# Patient Record
Sex: FEMALE | Race: WHITE | NOT HISPANIC OR LATINO | URBAN - METROPOLITAN AREA
[De-identification: names, ages, dates, MRNs, and addresses within clinical notes are randomized per-mention and may not be internally consistent; named-entity substitution may affect disease eponyms.]

---

## 2018-06-13 ENCOUNTER — EMERGENCY (EMERGENCY)
Facility: HOSPITAL | Age: 43
LOS: 1 days | Discharge: HOME | End: 2018-06-13
Attending: EMERGENCY MEDICINE | Admitting: EMERGENCY MEDICINE
Payer: COMMERCIAL

## 2018-06-13 VITALS
SYSTOLIC BLOOD PRESSURE: 152 MMHG | OXYGEN SATURATION: 100 % | HEART RATE: 64 BPM | TEMPERATURE: 99 F | DIASTOLIC BLOOD PRESSURE: 89 MMHG | RESPIRATION RATE: 16 BRPM

## 2018-06-13 VITALS
SYSTOLIC BLOOD PRESSURE: 156 MMHG | DIASTOLIC BLOOD PRESSURE: 95 MMHG | TEMPERATURE: 99 F | HEART RATE: 66 BPM | OXYGEN SATURATION: 100 % | RESPIRATION RATE: 16 BRPM

## 2018-06-13 LAB
ALBUMIN SERPL ELPH-MCNC: 4.8 G/DL — SIGNIFICANT CHANGE UP (ref 3.3–5)
ALP SERPL-CCNC: 66 U/L — SIGNIFICANT CHANGE UP (ref 40–120)
ALT FLD-CCNC: 19 U/L — SIGNIFICANT CHANGE UP (ref 4–33)
APPEARANCE UR: SIGNIFICANT CHANGE UP
AST SERPL-CCNC: 22 U/L — SIGNIFICANT CHANGE UP (ref 4–32)
BACTERIA # UR AUTO: SIGNIFICANT CHANGE UP
BASOPHILS # BLD AUTO: 0.12 K/UL — SIGNIFICANT CHANGE UP (ref 0–0.2)
BASOPHILS NFR BLD AUTO: 1.5 % — SIGNIFICANT CHANGE UP (ref 0–2)
BILIRUB SERPL-MCNC: 0.3 MG/DL — SIGNIFICANT CHANGE UP (ref 0.2–1.2)
BILIRUB UR-MCNC: NEGATIVE — SIGNIFICANT CHANGE UP
BLOOD UR QL VISUAL: HIGH
BUN SERPL-MCNC: 11 MG/DL — SIGNIFICANT CHANGE UP (ref 7–23)
CALCIUM SERPL-MCNC: 9.6 MG/DL — SIGNIFICANT CHANGE UP (ref 8.4–10.5)
CHLORIDE SERPL-SCNC: 99 MMOL/L — SIGNIFICANT CHANGE UP (ref 98–107)
CO2 SERPL-SCNC: 29 MMOL/L — SIGNIFICANT CHANGE UP (ref 22–31)
COLOR SPEC: SIGNIFICANT CHANGE UP
CREAT SERPL-MCNC: 0.8 MG/DL — SIGNIFICANT CHANGE UP (ref 0.5–1.3)
EOSINOPHIL # BLD AUTO: 0.31 K/UL — SIGNIFICANT CHANGE UP (ref 0–0.5)
EOSINOPHIL NFR BLD AUTO: 3.8 % — SIGNIFICANT CHANGE UP (ref 0–6)
GLUCOSE SERPL-MCNC: 86 MG/DL — SIGNIFICANT CHANGE UP (ref 70–99)
GLUCOSE UR-MCNC: NEGATIVE — SIGNIFICANT CHANGE UP
HCG SERPL-ACNC: < 5 MIU/ML — SIGNIFICANT CHANGE UP
HCT VFR BLD CALC: 38.8 % — SIGNIFICANT CHANGE UP (ref 34.5–45)
HGB BLD-MCNC: 13.5 G/DL — SIGNIFICANT CHANGE UP (ref 11.5–15.5)
IMM GRANULOCYTES # BLD AUTO: 0.02 # — SIGNIFICANT CHANGE UP
IMM GRANULOCYTES NFR BLD AUTO: 0.2 % — SIGNIFICANT CHANGE UP (ref 0–1.5)
KETONES UR-MCNC: NEGATIVE — SIGNIFICANT CHANGE UP
LEUKOCYTE ESTERASE UR-ACNC: SIGNIFICANT CHANGE UP
LIDOCAIN IGE QN: 57.4 U/L — SIGNIFICANT CHANGE UP (ref 7–60)
LYMPHOCYTES # BLD AUTO: 3.83 K/UL — HIGH (ref 1–3.3)
LYMPHOCYTES # BLD AUTO: 47.1 % — HIGH (ref 13–44)
MCHC RBC-ENTMCNC: 31.9 PG — SIGNIFICANT CHANGE UP (ref 27–34)
MCHC RBC-ENTMCNC: 34.8 % — SIGNIFICANT CHANGE UP (ref 32–36)
MCV RBC AUTO: 91.7 FL — SIGNIFICANT CHANGE UP (ref 80–100)
MONOCYTES # BLD AUTO: 0.73 K/UL — SIGNIFICANT CHANGE UP (ref 0–0.9)
MONOCYTES NFR BLD AUTO: 9 % — SIGNIFICANT CHANGE UP (ref 2–14)
NEUTROPHILS # BLD AUTO: 3.13 K/UL — SIGNIFICANT CHANGE UP (ref 1.8–7.4)
NEUTROPHILS NFR BLD AUTO: 38.4 % — LOW (ref 43–77)
NITRITE UR-MCNC: NEGATIVE — SIGNIFICANT CHANGE UP
NRBC # FLD: 0 — SIGNIFICANT CHANGE UP
PH UR: 7 — SIGNIFICANT CHANGE UP (ref 4.6–8)
PLATELET # BLD AUTO: 282 K/UL — SIGNIFICANT CHANGE UP (ref 150–400)
PMV BLD: 11.2 FL — SIGNIFICANT CHANGE UP (ref 7–13)
POTASSIUM SERPL-MCNC: 3.9 MMOL/L — SIGNIFICANT CHANGE UP (ref 3.5–5.3)
POTASSIUM SERPL-SCNC: 3.9 MMOL/L — SIGNIFICANT CHANGE UP (ref 3.5–5.3)
PROT SERPL-MCNC: 7.1 G/DL — SIGNIFICANT CHANGE UP (ref 6–8.3)
PROT UR-MCNC: NEGATIVE MG/DL — SIGNIFICANT CHANGE UP
RBC # BLD: 4.23 M/UL — SIGNIFICANT CHANGE UP (ref 3.8–5.2)
RBC # FLD: 13.4 % — SIGNIFICANT CHANGE UP (ref 10.3–14.5)
RBC CASTS # UR COMP ASSIST: SIGNIFICANT CHANGE UP (ref 0–?)
SODIUM SERPL-SCNC: 142 MMOL/L — SIGNIFICANT CHANGE UP (ref 135–145)
SP GR SPEC: 1.01 — SIGNIFICANT CHANGE UP (ref 1–1.04)
SQUAMOUS # UR AUTO: SIGNIFICANT CHANGE UP
UROBILINOGEN FLD QL: NORMAL MG/DL — SIGNIFICANT CHANGE UP
WBC # BLD: 8.14 K/UL — SIGNIFICANT CHANGE UP (ref 3.8–10.5)
WBC # FLD AUTO: 8.14 K/UL — SIGNIFICANT CHANGE UP (ref 3.8–10.5)
WBC UR QL: HIGH (ref 0–?)

## 2018-06-13 PROCEDURE — 76830 TRANSVAGINAL US NON-OB: CPT | Mod: 26

## 2018-06-13 PROCEDURE — 99284 EMERGENCY DEPT VISIT MOD MDM: CPT

## 2018-06-13 RX ORDER — KETOROLAC TROMETHAMINE 30 MG/ML
15 SYRINGE (ML) INJECTION ONCE
Qty: 0 | Refills: 0 | Status: DISCONTINUED | OUTPATIENT
Start: 2018-06-13 | End: 2018-06-13

## 2018-06-13 RX ADMIN — Medication 15 MILLIGRAM(S): at 19:07

## 2018-06-13 NOTE — ED PROVIDER NOTE - ATTENDING CONTRIBUTION TO CARE
Dr. Rendon:  I have personally performed a face to face bedside history and physical examination of this patient. I have discussed the history, examination, review of systems, assessment and plan of management with the resident. I have reviewed the electronic medical record and amended it to reflect my history, review of systems, physical exam, assessment and plan.    42F h/o R ovarian cyst and menorrhagia presents with vaginal bleeding and R pelvic pain that seems worse than usual.    Exam:  - nad  - rrr  - ctab   -abd soft ntnd  - pelvic as per resident    A/P  - likely exacerbation of symptoms, r/o ovarian torsion  - cbc, cmp, ua  - transvaginal US

## 2018-06-13 NOTE — ED PROVIDER NOTE - OBJECTIVE STATEMENT
Kelly Thakkar, DO: 42F R ovarian cysts with hx of metromenorrhagia here for vaginal bleeding associated with R sided abdominal pain described as pulling, intermittent without exacerbating or allevating factors. Took 400 mg Motrin 7AM. +malodorous urine. Missed appointment on [facility] 3 for endometrial ablation. Started on BC ~4 month ago. Sexual preference for men, last sexually active 3 weeks ago.     PMHx: anxiety on Citalopram.  PSHx: appendectomy, cholecystectomy  Allergies: Sulfa Kelly Ariane, DO: 42F R ovarian cysts with hx of menorrhagia here for vaginal bleeding associated with R sided abdominal pain described as pulling, intermittent without exacerbating or allevating factors. Took 400 mg Motrin 7AM. +malodorous urine. Missed appointment on [facility] 3 for endometrial ablation. Started on BC ~4 month ago. Sexual preference for men, last sexually active 3 weeks ago. 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 Denies vaginal discharge, fevers, nausea/vomiting, CP/SOB, diarrhea.     PMHx: anxiety on Citalopram.  PSHx: appendectomy, cholecystectomy 13 years ago  Allergies: Sulfa

## 2018-06-13 NOTE — ED ADULT NURSE NOTE - OBJECTIVE STATEMENT
pt in intake complaining of rlq pain  with vag discharge  pt has had appendectomy and cholecystectomy  vag exm by ed md done saline lock 20 g left ac placed labs sent urine and u culture sent will continue to monitor

## 2018-06-13 NOTE — ED PROVIDER NOTE - MEDICAL DECISION MAKING DETAILS
Kelly Thakkar, DO: 42F with hx of metrorrhagia here for RLQ pain with RLQ TTP & RUQ TTP. Hx of cholecystectomy 13 years ago. No pain associated with eating. Upreg, pain control & reassessment with referral to OBGYN for scheduled endometrial biopsy.

## 2018-06-13 NOTE — ED PROVIDER NOTE - PHYSICAL EXAMINATION
Kelly Thakkar, DO:   Gen: Well appearing, NAD  Head: NCAT  HEENT: PERRL, MMM, normal conjunctiva, anicteric, neck supple  Lung: CTAB, no adventitious sounds  CV: RRR, no murmurs, rubs or gallops  Abd: soft, TTP on right side & epigastrum UQ>LQ without rebound or guarding, no CVAT  : external genitalia normal, brown/bloody discharge from cervical os, no adnexal TTP or CMT  MSK: No edema, no visible deformities  Neuro: Ambulatory with steady gait  Skin: Warm and dry, no evidence of rash  Psych: normal mood and affect

## 2018-06-15 LAB
BACTERIA UR CULT: SIGNIFICANT CHANGE UP
SPECIMEN SOURCE: SIGNIFICANT CHANGE UP

## 2021-09-01 ENCOUNTER — OUTPATIENT (OUTPATIENT)
Dept: OUTPATIENT SERVICES | Facility: HOSPITAL | Age: 46
LOS: 1 days | End: 2021-09-01
Payer: COMMERCIAL

## 2021-09-01 VITALS
HEIGHT: 67 IN | OXYGEN SATURATION: 97 % | SYSTOLIC BLOOD PRESSURE: 118 MMHG | HEART RATE: 70 BPM | DIASTOLIC BLOOD PRESSURE: 86 MMHG | WEIGHT: 220.02 LBS | RESPIRATION RATE: 16 BRPM | TEMPERATURE: 98 F

## 2021-09-01 DIAGNOSIS — Z85.828 PERSONAL HISTORY OF OTHER MALIGNANT NEOPLASM OF SKIN: Chronic | ICD-10-CM

## 2021-09-01 DIAGNOSIS — N84.0 POLYP OF CORPUS UTERI: ICD-10-CM

## 2021-09-01 LAB
ANION GAP SERPL CALC-SCNC: 15 MMOL/L — HIGH (ref 7–14)
BUN SERPL-MCNC: 21 MG/DL — SIGNIFICANT CHANGE UP (ref 7–23)
CALCIUM SERPL-MCNC: 9.6 MG/DL — SIGNIFICANT CHANGE UP (ref 8.4–10.5)
CHLORIDE SERPL-SCNC: 95 MMOL/L — LOW (ref 98–107)
CO2 SERPL-SCNC: 27 MMOL/L — SIGNIFICANT CHANGE UP (ref 22–31)
CREAT SERPL-MCNC: 0.88 MG/DL — SIGNIFICANT CHANGE UP (ref 0.5–1.3)
GLUCOSE SERPL-MCNC: 83 MG/DL — SIGNIFICANT CHANGE UP (ref 70–99)
HCG UR QL: NEGATIVE — SIGNIFICANT CHANGE UP
HCT VFR BLD CALC: 36.6 % — SIGNIFICANT CHANGE UP (ref 34.5–45)
HGB BLD-MCNC: 12.6 G/DL — SIGNIFICANT CHANGE UP (ref 11.5–15.5)
MCHC RBC-ENTMCNC: 32.1 PG — SIGNIFICANT CHANGE UP (ref 27–34)
MCHC RBC-ENTMCNC: 34.4 GM/DL — SIGNIFICANT CHANGE UP (ref 32–36)
MCV RBC AUTO: 93.4 FL — SIGNIFICANT CHANGE UP (ref 80–100)
NRBC # BLD: 0 /100 WBCS — SIGNIFICANT CHANGE UP
NRBC # FLD: 0 K/UL — SIGNIFICANT CHANGE UP
PLATELET # BLD AUTO: 422 K/UL — HIGH (ref 150–400)
POTASSIUM SERPL-MCNC: 4.2 MMOL/L — SIGNIFICANT CHANGE UP (ref 3.5–5.3)
POTASSIUM SERPL-SCNC: 4.2 MMOL/L — SIGNIFICANT CHANGE UP (ref 3.5–5.3)
RBC # BLD: 3.92 M/UL — SIGNIFICANT CHANGE UP (ref 3.8–5.2)
RBC # FLD: 13.2 % — SIGNIFICANT CHANGE UP (ref 10.3–14.5)
SODIUM SERPL-SCNC: 137 MMOL/L — SIGNIFICANT CHANGE UP (ref 135–145)
WBC # BLD: 10.61 K/UL — HIGH (ref 3.8–10.5)
WBC # FLD AUTO: 10.61 K/UL — HIGH (ref 3.8–10.5)

## 2021-09-01 PROCEDURE — 93010 ELECTROCARDIOGRAM REPORT: CPT

## 2021-09-01 NOTE — H&P PST ADULT - PROBLEM SELECTOR PLAN 1
This is a 45 y/o female who is scheduled for D&C, hysteroscopy with symphion on 9-21-21  * Instructed to speak with surgeon regarding covid testing  * Given preop instructions  * Instructed to take normal am dose of losartan, citalopram, and albuterol (if needed) the am of surgery

## 2021-09-01 NOTE — H&P PST ADULT - NSICDXPASTMEDICALHX_GEN_ALL_CORE_FT
PAST MEDICAL HISTORY:  Anxiety     Asthma     Bacterial UTI on antibiotic to be completed on 9-2-21    Depression     Fibroids     Irregular bleeding

## 2021-09-01 NOTE — H&P PST ADULT - NSICDXPASTSURGICALHX_GEN_ALL_CORE_FT
PAST SURGICAL HISTORY:  History of skin cancer Moh's surgery for squamous cell CA on the nose    Knee injury left knee orthoscopic surgery 1989    S/P appendectomy 2000    S/P cholecystectomy 2004

## 2021-09-01 NOTE — H&P PST ADULT - NSANTHOSAYNRD_GEN_A_CORE
No. LANDON screening performed.  STOP BANG Legend: 0-2 = LOW Risk; 3-4 = INTERMEDIATE Risk; 5-8 = HIGH Risk

## 2021-09-17 DIAGNOSIS — Z01.818 ENCOUNTER FOR OTHER PREPROCEDURAL EXAMINATION: ICD-10-CM

## 2021-09-18 ENCOUNTER — APPOINTMENT (OUTPATIENT)
Dept: DISASTER EMERGENCY | Facility: CLINIC | Age: 46
End: 2021-09-18

## 2021-09-19 LAB — SARS-COV-2 N GENE NPH QL NAA+PROBE: NOT DETECTED

## 2021-09-20 ENCOUNTER — TRANSCRIPTION ENCOUNTER (OUTPATIENT)
Age: 46
End: 2021-09-20

## 2021-09-20 VITALS
HEART RATE: 52 BPM | TEMPERATURE: 97 F | RESPIRATION RATE: 18 BRPM | HEIGHT: 67 IN | SYSTOLIC BLOOD PRESSURE: 120 MMHG | OXYGEN SATURATION: 100 % | WEIGHT: 220.02 LBS | DIASTOLIC BLOOD PRESSURE: 89 MMHG

## 2021-09-21 ENCOUNTER — RESULT REVIEW (OUTPATIENT)
Age: 46
End: 2021-09-21

## 2021-09-21 ENCOUNTER — OUTPATIENT (OUTPATIENT)
Dept: OUTPATIENT SERVICES | Facility: HOSPITAL | Age: 46
LOS: 1 days | Discharge: HOME | End: 2021-09-21
Payer: COMMERCIAL

## 2021-09-21 VITALS
OXYGEN SATURATION: 100 % | TEMPERATURE: 97 F | RESPIRATION RATE: 15 BRPM | DIASTOLIC BLOOD PRESSURE: 97 MMHG | SYSTOLIC BLOOD PRESSURE: 148 MMHG | HEART RATE: 53 BPM

## 2021-09-21 DIAGNOSIS — Z85.828 PERSONAL HISTORY OF OTHER MALIGNANT NEOPLASM OF SKIN: Chronic | ICD-10-CM

## 2021-09-21 DIAGNOSIS — N84.0 POLYP OF CORPUS UTERI: ICD-10-CM

## 2021-09-21 PROCEDURE — 88305 TISSUE EXAM BY PATHOLOGIST: CPT | Mod: 26

## 2021-09-21 RX ORDER — NITROFURANTOIN MACROCRYSTAL 50 MG
100 CAPSULE ORAL
Qty: 0 | Refills: 0 | DISCHARGE

## 2021-09-21 RX ORDER — SODIUM CHLORIDE 9 MG/ML
1000 INJECTION, SOLUTION INTRAVENOUS
Refills: 0 | Status: DISCONTINUED | OUTPATIENT
Start: 2021-09-21 | End: 2021-10-05

## 2021-09-21 NOTE — ASU PREOP CHECKLIST - PATIENT SENT TO
Left patient a VM asking to please call back to set up an apt  If patient calls back please refer to message below  Thank you!     ----- Message from Ava Jimenez MD sent at 4/30/2021  3:45 PM EDT -----    Please have this patient schedule an appointment to discuss audiology results  operating room

## 2021-09-21 NOTE — ASU DISCHARGE PLAN (ADULT/PEDIATRIC) - CARE PROVIDER_API CALL
Connie Bah  OBSTETRICS AND GYNECOLOGY  [address], [address]  [place] [address]  Phone: [phone]  Fax: [phone]  Follow Up Time: 2 weeks

## 2021-09-28 LAB — SURGICAL PATHOLOGY STUDY: SIGNIFICANT CHANGE UP

## 2021-11-18 ENCOUNTER — EMERGENCY (EMERGENCY)
Facility: HOSPITAL | Age: 46
LOS: 1 days | Discharge: HOME | End: 2021-11-18
Attending: EMERGENCY MEDICINE
Payer: COMMERCIAL

## 2021-11-18 VITALS
OXYGEN SATURATION: 99 % | WEIGHT: 225.97 LBS | HEIGHT: 67 IN | DIASTOLIC BLOOD PRESSURE: 89 MMHG | RESPIRATION RATE: 19 BRPM | SYSTOLIC BLOOD PRESSURE: 145 MMHG | HEART RATE: 107 BPM | TEMPERATURE: 98 F

## 2021-11-18 DIAGNOSIS — Z85.828 PERSONAL HISTORY OF OTHER MALIGNANT NEOPLASM OF SKIN: Chronic | ICD-10-CM

## 2021-11-18 LAB
ALBUMIN SERPL ELPH-MCNC: 4.6 G/DL — SIGNIFICANT CHANGE UP (ref 3.3–5)
ALP SERPL-CCNC: 71 U/L — SIGNIFICANT CHANGE UP (ref 40–120)
ALT FLD-CCNC: 15 U/L — SIGNIFICANT CHANGE UP (ref 10–45)
ANION GAP SERPL CALC-SCNC: 14 MMOL/L — SIGNIFICANT CHANGE UP (ref 5–17)
AST SERPL-CCNC: 15 U/L — SIGNIFICANT CHANGE UP (ref 10–40)
BASE EXCESS BLDV CALC-SCNC: 3.2 MMOL/L — HIGH (ref -2–2)
BILIRUB SERPL-MCNC: 0.3 MG/DL — SIGNIFICANT CHANGE UP (ref 0.2–1.2)
BUN SERPL-MCNC: 14 MG/DL — SIGNIFICANT CHANGE UP (ref 7–23)
CA-I SERPL-SCNC: 1.18 MMOL/L — SIGNIFICANT CHANGE UP (ref 1.15–1.33)
CALCIUM SERPL-MCNC: 9.3 MG/DL — SIGNIFICANT CHANGE UP (ref 8.4–10.5)
CHLORIDE BLDV-SCNC: 100 MMOL/L — SIGNIFICANT CHANGE UP (ref 96–108)
CHLORIDE SERPL-SCNC: 98 MMOL/L — SIGNIFICANT CHANGE UP (ref 96–108)
CO2 BLDV-SCNC: 30 MMOL/L — HIGH (ref 22–26)
CO2 SERPL-SCNC: 24 MMOL/L — SIGNIFICANT CHANGE UP (ref 22–31)
CREAT SERPL-MCNC: 0.76 MG/DL — SIGNIFICANT CHANGE UP (ref 0.5–1.3)
GAS PNL BLDV: 135 MMOL/L — LOW (ref 136–145)
GAS PNL BLDV: SIGNIFICANT CHANGE UP
GLUCOSE BLDV-MCNC: 83 MG/DL — SIGNIFICANT CHANGE UP (ref 70–99)
GLUCOSE SERPL-MCNC: 87 MG/DL — SIGNIFICANT CHANGE UP (ref 70–99)
HCO3 BLDV-SCNC: 28 MMOL/L — SIGNIFICANT CHANGE UP (ref 22–29)
HCT VFR BLDA CALC: 41 % — SIGNIFICANT CHANGE UP (ref 34.5–46.5)
HGB BLD CALC-MCNC: 13.5 G/DL — SIGNIFICANT CHANGE UP (ref 11.7–16.1)
LACTATE BLDV-MCNC: 1 MMOL/L — SIGNIFICANT CHANGE UP (ref 0.7–2)
LIDOCAIN IGE QN: 38 U/L — SIGNIFICANT CHANGE UP (ref 7–60)
PCO2 BLDV: 45 MMHG — HIGH (ref 39–42)
PH BLDV: 7.41 — SIGNIFICANT CHANGE UP (ref 7.32–7.43)
PO2 BLDV: 23 MMHG — LOW (ref 25–45)
POTASSIUM BLDV-SCNC: 3.6 MMOL/L — SIGNIFICANT CHANGE UP (ref 3.5–5.1)
POTASSIUM SERPL-MCNC: 3.5 MMOL/L — SIGNIFICANT CHANGE UP (ref 3.5–5.3)
POTASSIUM SERPL-SCNC: 3.5 MMOL/L — SIGNIFICANT CHANGE UP (ref 3.5–5.3)
PROT SERPL-MCNC: 7 G/DL — SIGNIFICANT CHANGE UP (ref 6–8.3)
SAO2 % BLDV: 40.1 % — LOW (ref 67–88)
SODIUM SERPL-SCNC: 136 MMOL/L — SIGNIFICANT CHANGE UP (ref 135–145)

## 2021-11-18 PROCEDURE — 99285 EMERGENCY DEPT VISIT HI MDM: CPT

## 2021-11-18 RX ORDER — ACETAMINOPHEN 500 MG
975 TABLET ORAL ONCE
Refills: 0 | Status: COMPLETED | OUTPATIENT
Start: 2021-11-18 | End: 2021-11-18

## 2021-11-18 RX ORDER — IBUPROFEN 200 MG
600 TABLET ORAL ONCE
Refills: 0 | Status: COMPLETED | OUTPATIENT
Start: 2021-11-18 | End: 2021-11-18

## 2021-11-18 RX ORDER — ONDANSETRON 8 MG/1
4 TABLET, FILM COATED ORAL ONCE
Refills: 0 | Status: COMPLETED | OUTPATIENT
Start: 2021-11-18 | End: 2021-11-18

## 2021-11-18 RX ADMIN — ONDANSETRON 4 MILLIGRAM(S): 8 TABLET, FILM COATED ORAL at 22:39

## 2021-11-18 RX ADMIN — Medication 975 MILLIGRAM(S): at 22:39

## 2021-11-18 RX ADMIN — Medication 600 MILLIGRAM(S): at 22:39

## 2021-11-18 NOTE — ED PROVIDER NOTE - RAPID ASSESSMENT
46y F p/w intermittent R sided abd pain x2 weeks, worsening today. Describes pain as spasm like in nature. Endorses associated nausea. Notes that she had 3 BMs yesterday, normal.  No pain meds for symptoms PTA. Denies vomiting, diarrhea, fever, chills, urinary symptoms.     Patient was seen as a tele QDOC patient. The patient will be seen and further worked up in the main emergency department and their care will be completed by the main emergency department team along with a thorough physical exam. Receiving team will follow up on labs, analgesia, any clinical imaging, reassess and disposition as clinically indicated, all decisions regarding the progression of care will be made at their discretion.    Scribe Statement: TAYLOR, Avel Fritz, attest that this documentation has been prepared under the direction and in the presence of Archie Cedillo) 46y F p/w intermittent R sided abd pain x2 weeks, worsening today. Describes pain as spasm like in nature. Endorses associated nausea. Notes that she had 3 BMs yesterday, normal.  No pain meds for symptoms PTA. Denies vomiting, diarrhea, fever, chills, urinary symptoms.     Patient was seen as a tele QDOC patient. The patient will be seen and further worked up in the main emergency department and their care will be completed by the main emergency department team along with a thorough physical exam. Receiving team will follow up on labs, analgesia, any clinical imaging, reassess and disposition as clinically indicated, all decisions regarding the progression of care will be made at their discretion.    Scribe Statement: Avel VAZQUEZ, attest that this documentation has been prepared under the direction and in the presence of Archie Cedillo)    Patient was seen as a QPA patient. The patient will be seen and further worked up in the main emergency department and their care will be completed by the main emergency department team along with a thorough physical exam. Receiving team will follow up on labs, analgesia, any clinical imaging, reassess and disposition as clinically indicated, all decisions regarding the progression of care will be made at their discretion. 46y F p/w intermittent R sided abd pain x2 weeks, worsening today. Describes pain as spasm like in nature. Endorses associated nausea. Notes that she had 3 BMs yesterday, normal.  No pain meds for symptoms PTA. Denies vomiting, diarrhea, fever, chills, urinary symptoms.     Patient was seen as a tele QDOC patient. The patient will be seen and further worked up in the main emergency department and their care will be completed by the main emergency department team along with a thorough physical exam. Receiving team will follow up on labs, analgesia, any clinical imaging, reassess and disposition as clinically indicated, all decisions regarding the progression of care will be made at their discretion.    Scribe Statement: I, Avel Fritz, attest that this documentation has been prepared under the direction and in the presence of Archie Cedillo (PA)    Patient was seen as a QPA patient. The patient will be seen and further worked up in the main emergency department and their care will be completed by the main emergency department team along with a thorough physical exam. Receiving team will follow up on labs, analgesia, any clinical imaging, reassess and disposition as clinically indicated, all decisions regarding the progression of care will be made at their discretion.    Attending MD Arroyo: This patient was seen and orders were placed by the PA as per our department's QPA model.  I was not consulted in regards to this patient although I was present and available in the Emergency Department to the PA.  Patient was to be sent to main ED for full medical evaluation and receiving team was to follow up on any labs, analgesia, clinical imaging ordered by the PA.  Any reassessment and disposition decisions were to be made by receiving team as clinically indicated, all decisions regarding the progression of care to be made at their discretion.  I did not perform a comprehensive history and physical on this patient.

## 2021-11-18 NOTE — ED PROVIDER NOTE - NSFOLLOWUPINSTRUCTIONS_ED_ALL_ED_FT
You were seen in the ED for abdominal pain.  Your blood work and CT abdomen were reassuring and did not show a reason for your pain.  It's unclear what caused your pain.  You need to follow up with a gastroenterologist for further management in 2-3 days.      For pain, please take 650mg Tylenol every 6 hours as needed.  Take Maalox and pepcid as per the over the counter bottles.    Please follow up with your primary care doctor in 2-3 days. Return to the ED if you experience any worsening or new symptoms or any symptoms that concern you, including fevers, chills, shortness of breath, chest pain, abdominal pain, vomiting.

## 2021-11-18 NOTE — ED PROVIDER NOTE - ATTENDING CONTRIBUTION TO CARE
I saw and evaluated patient with resident. I discussed H+P and MDM with resident. I agree with the statements made by the resident and have made any relevant edits on the note above.

## 2021-11-18 NOTE — ED PROVIDER NOTE - PHYSICAL EXAMINATION
Ayleen Cardoza MD  GENERAL: Patient awake alert NAD.  HEENT: NC/AT, Moist mucous membranes, EOMI.  LUNGS: CTAB, no wheezes or crackles.   CARDIAC: RRR, no m/r/g.    ABDOMEN: Soft, +tender RUQ and epigastric, ND, No rebound, guarding. No CVA tenderness.   EXT: No edema.   MSK: no pain with movement, no deformities.  NEURO: A&Ox3. Moving all extremities.  SKIN: Warm and dry. No rash.  PSYCH: Normal affect.

## 2021-11-18 NOTE — ED PROVIDER NOTE - OBJECTIVE STATEMENT
47 yo F with h/o cholecystectomy and appendectomy presenting with abd pain. Pain has been going on for 2 weeks, worsening today. Episodes last 2-3 hrs, with 2-3 hrs of relief in between. Feels like a spasm and a cramp. Does not radiate. Endorses nausea, denies vomiting. Denies diarrhea, constipation, fevers, chills, SOB, CP. Last bowel movement Thurs am, no melena. No hematuria, dysuria, vaginal discharge. Does not drink alcohol. Pain worse with eating sometimes

## 2021-11-18 NOTE — ED PROVIDER NOTE - NSICDXPASTMEDICALHX_GEN_ALL_CORE_FT
PAST MEDICAL HISTORY:  Anxiety     Asthma     Bacterial UTI on antibiotic to be completed on 9-2-21    Depression     Fibroids     Hypothyroidism     Irregular bleeding

## 2021-11-18 NOTE — ED PROVIDER NOTE - PROGRESS NOTE DETAILS
Dr. Lobo's Note: Pt's workup neg for acute intra-abd pathology. labs unremarkable. on re-eval, pt is well appearing, mild tenderness but improved pain. pt tolerating po. Multiple diagnoses were considered during patient's evaluation today. While exact etiology of symptoms is unclear, there appears to be no emergent process today that would require further emergent or inpatient management. Pt is safe for dc with outpt f/u and return instructions if symptoms worsen.

## 2021-11-18 NOTE — ED PROVIDER NOTE - CLINICAL SUMMARY MEDICAL DECISION MAKING FREE TEXT BOX
pt with h/o cholecystectomy and appendectomy presenting with abd pain. concern for sbo low, likely GERD or PUD, however given tenderness will check labs and CT abd 45 yo F with h/o cholecystectomy and appendectomy presenting with abd pain. concern for pancreatitis vs sbo low, likely GERD or PUD, however given tenderness will check labs and CT abd

## 2021-11-18 NOTE — ED PROVIDER NOTE - PATIENT PORTAL LINK FT
You can access the FollowMyHealth Patient Portal offered by [facility] by registering at the following website: [email or web address]. By joining [employer]’s FollowMyHealth portal, you will also be able to view your health information using other applications (apps) compatible with our system.

## 2021-11-18 NOTE — ED ADULT TRIAGE NOTE - CCCP TRG CHIEF CMPLNT
No interval changes since initial CDU provider note. Pt feels well without complaint. NAD VSS. no events on tele. Plan for tele and stress test today. - EMA Raya
abdominal pain

## 2021-11-19 VITALS
DIASTOLIC BLOOD PRESSURE: 68 MMHG | OXYGEN SATURATION: 100 % | RESPIRATION RATE: 17 BRPM | TEMPERATURE: 98 F | HEART RATE: 78 BPM | SYSTOLIC BLOOD PRESSURE: 123 MMHG

## 2021-11-19 PROBLEM — E03.9 HYPOTHYROIDISM, UNSPECIFIED: Chronic | Status: ACTIVE | Noted: 2021-09-21

## 2021-11-19 LAB
APPEARANCE UR: CLEAR — SIGNIFICANT CHANGE UP
BACTERIA # UR AUTO: ABNORMAL
BILIRUB UR-MCNC: NEGATIVE — SIGNIFICANT CHANGE UP
COLOR SPEC: SIGNIFICANT CHANGE UP
DIFF PNL FLD: NEGATIVE — SIGNIFICANT CHANGE UP
EPI CELLS # UR: 4 /HPF — SIGNIFICANT CHANGE UP
GLUCOSE UR QL: NEGATIVE — SIGNIFICANT CHANGE UP
HYALINE CASTS # UR AUTO: 2 /LPF — SIGNIFICANT CHANGE UP (ref 0–2)
KETONES UR-MCNC: ABNORMAL
LEUKOCYTE ESTERASE UR-ACNC: NEGATIVE — SIGNIFICANT CHANGE UP
NITRITE UR-MCNC: NEGATIVE — SIGNIFICANT CHANGE UP
PH UR: 6 — SIGNIFICANT CHANGE UP (ref 5–8)
PROT UR-MCNC: NEGATIVE — SIGNIFICANT CHANGE UP
RBC CASTS # UR COMP ASSIST: 11 /HPF — HIGH (ref 0–4)
SARS-COV-2 RNA SPEC QL NAA+PROBE: SIGNIFICANT CHANGE UP
SP GR SPEC: 1.02 — SIGNIFICANT CHANGE UP (ref 1.01–1.02)
UROBILINOGEN FLD QL: NEGATIVE — SIGNIFICANT CHANGE UP
WBC UR QL: 5 /HPF — SIGNIFICANT CHANGE UP (ref 0–5)

## 2021-11-19 PROCEDURE — 74177 CT ABD & PELVIS W/CONTRAST: CPT | Mod: 26,MA

## 2021-11-19 PROCEDURE — 84132 ASSAY OF SERUM POTASSIUM: CPT

## 2021-11-19 PROCEDURE — U0005: CPT

## 2021-11-19 PROCEDURE — 85018 HEMOGLOBIN: CPT

## 2021-11-19 PROCEDURE — 82435 ASSAY OF BLOOD CHLORIDE: CPT

## 2021-11-19 PROCEDURE — 87086 URINE CULTURE/COLONY COUNT: CPT

## 2021-11-19 PROCEDURE — 85014 HEMATOCRIT: CPT

## 2021-11-19 PROCEDURE — 99284 EMERGENCY DEPT VISIT MOD MDM: CPT | Mod: 25

## 2021-11-19 PROCEDURE — 85025 COMPLETE CBC W/AUTO DIFF WBC: CPT

## 2021-11-19 PROCEDURE — 36415 COLL VENOUS BLD VENIPUNCTURE: CPT

## 2021-11-19 PROCEDURE — 82330 ASSAY OF CALCIUM: CPT

## 2021-11-19 PROCEDURE — 74177 CT ABD & PELVIS W/CONTRAST: CPT | Mod: MA

## 2021-11-19 PROCEDURE — 83690 ASSAY OF LIPASE: CPT

## 2021-11-19 PROCEDURE — 81001 URINALYSIS AUTO W/SCOPE: CPT

## 2021-11-19 PROCEDURE — 80053 COMPREHEN METABOLIC PANEL: CPT

## 2021-11-19 PROCEDURE — 96375 TX/PRO/DX INJ NEW DRUG ADDON: CPT

## 2021-11-19 PROCEDURE — U0003: CPT

## 2021-11-19 PROCEDURE — 83605 ASSAY OF LACTIC ACID: CPT

## 2021-11-19 PROCEDURE — 96374 THER/PROPH/DIAG INJ IV PUSH: CPT | Mod: XU

## 2021-11-19 PROCEDURE — 82803 BLOOD GASES ANY COMBINATION: CPT

## 2021-11-19 PROCEDURE — 82947 ASSAY GLUCOSE BLOOD QUANT: CPT

## 2021-11-19 PROCEDURE — 81025 URINE PREGNANCY TEST: CPT

## 2021-11-19 PROCEDURE — 84295 ASSAY OF SERUM SODIUM: CPT

## 2021-11-19 RX ORDER — SODIUM CHLORIDE 9 MG/ML
1000 INJECTION, SOLUTION INTRAVENOUS ONCE
Refills: 0 | Status: COMPLETED | OUTPATIENT
Start: 2021-11-19 | End: 2021-11-19

## 2021-11-19 RX ORDER — FAMOTIDINE 10 MG/ML
20 INJECTION INTRAVENOUS ONCE
Refills: 0 | Status: COMPLETED | OUTPATIENT
Start: 2021-11-19 | End: 2021-11-19

## 2021-11-19 RX ORDER — MORPHINE SULFATE 50 MG/1
4 CAPSULE, EXTENDED RELEASE ORAL ONCE
Refills: 0 | Status: DISCONTINUED | OUTPATIENT
Start: 2021-11-19 | End: 2021-11-19

## 2021-11-19 RX ADMIN — Medication 30 MILLILITER(S): at 00:49

## 2021-11-19 RX ADMIN — FAMOTIDINE 20 MILLIGRAM(S): 10 INJECTION INTRAVENOUS at 00:52

## 2021-11-19 RX ADMIN — SODIUM CHLORIDE 1000 MILLILITER(S): 9 INJECTION, SOLUTION INTRAVENOUS at 01:04

## 2021-11-19 RX ADMIN — MORPHINE SULFATE 4 MILLIGRAM(S): 50 CAPSULE, EXTENDED RELEASE ORAL at 00:49

## 2021-11-19 NOTE — ED ADULT NURSE NOTE - OBJECTIVE STATEMENT
46 year old female presents ambulatory to ED c/o right sided spasm like abdominal pain x 2 weeks. PT had 3 BMs yesterday which was abnormal for her- but the bowel movements themselves were normal. Pt has not taken anything fro pain prior to arrival

## 2021-11-20 LAB
CULTURE RESULTS: SIGNIFICANT CHANGE UP
SPECIMEN SOURCE: SIGNIFICANT CHANGE UP

## 2023-04-27 ENCOUNTER — OUTPATIENT (OUTPATIENT)
Dept: OUTPATIENT SERVICES | Facility: HOSPITAL | Age: 48
LOS: 1 days | End: 2023-04-27
Payer: COMMERCIAL

## 2023-04-27 VITALS
HEIGHT: 67.25 IN | DIASTOLIC BLOOD PRESSURE: 84 MMHG | HEART RATE: 62 BPM | OXYGEN SATURATION: 98 % | TEMPERATURE: 98 F | SYSTOLIC BLOOD PRESSURE: 135 MMHG | RESPIRATION RATE: 14 BRPM | WEIGHT: 223.99 LBS

## 2023-04-27 DIAGNOSIS — Z85.828 PERSONAL HISTORY OF OTHER MALIGNANT NEOPLASM OF SKIN: Chronic | ICD-10-CM

## 2023-04-27 DIAGNOSIS — N84.0 POLYP OF CORPUS UTERI: ICD-10-CM

## 2023-04-27 DIAGNOSIS — Z98.890 OTHER SPECIFIED POSTPROCEDURAL STATES: Chronic | ICD-10-CM

## 2023-04-27 LAB
ANION GAP SERPL CALC-SCNC: 12 MMOL/L — SIGNIFICANT CHANGE UP (ref 7–14)
BUN SERPL-MCNC: 16 MG/DL — SIGNIFICANT CHANGE UP (ref 7–23)
CALCIUM SERPL-MCNC: 9.7 MG/DL — SIGNIFICANT CHANGE UP (ref 8.4–10.5)
CHLORIDE SERPL-SCNC: 100 MMOL/L — SIGNIFICANT CHANGE UP (ref 98–107)
CO2 SERPL-SCNC: 26 MMOL/L — SIGNIFICANT CHANGE UP (ref 22–31)
CREAT SERPL-MCNC: 0.81 MG/DL — SIGNIFICANT CHANGE UP (ref 0.5–1.3)
EGFR: 90 ML/MIN/1.73M2 — SIGNIFICANT CHANGE UP
GLUCOSE SERPL-MCNC: 86 MG/DL — SIGNIFICANT CHANGE UP (ref 70–99)
HCG UR QL: NEGATIVE — SIGNIFICANT CHANGE UP
HCT VFR BLD CALC: 37.6 % — SIGNIFICANT CHANGE UP (ref 34.5–45)
HGB BLD-MCNC: 12.9 G/DL — SIGNIFICANT CHANGE UP (ref 11.5–15.5)
MCHC RBC-ENTMCNC: 32.3 PG — SIGNIFICANT CHANGE UP (ref 27–34)
MCHC RBC-ENTMCNC: 34.3 GM/DL — SIGNIFICANT CHANGE UP (ref 32–36)
MCV RBC AUTO: 94 FL — SIGNIFICANT CHANGE UP (ref 80–100)
NRBC # BLD: 0 /100 WBCS — SIGNIFICANT CHANGE UP (ref 0–0)
NRBC # FLD: 0 K/UL — SIGNIFICANT CHANGE UP (ref 0–0)
PLATELET # BLD AUTO: 318 K/UL — SIGNIFICANT CHANGE UP (ref 150–400)
POTASSIUM SERPL-MCNC: 3.6 MMOL/L — SIGNIFICANT CHANGE UP (ref 3.5–5.3)
POTASSIUM SERPL-SCNC: 3.6 MMOL/L — SIGNIFICANT CHANGE UP (ref 3.5–5.3)
RBC # BLD: 4 M/UL — SIGNIFICANT CHANGE UP (ref 3.8–5.2)
RBC # FLD: 13.1 % — SIGNIFICANT CHANGE UP (ref 10.3–14.5)
SODIUM SERPL-SCNC: 138 MMOL/L — SIGNIFICANT CHANGE UP (ref 135–145)
WBC # BLD: 8.81 K/UL — SIGNIFICANT CHANGE UP (ref 3.8–10.5)
WBC # FLD AUTO: 8.81 K/UL — SIGNIFICANT CHANGE UP (ref 3.8–10.5)

## 2023-04-27 PROCEDURE — 93010 ELECTROCARDIOGRAM REPORT: CPT

## 2023-04-27 RX ORDER — ALBUTEROL 90 UG/1
2 AEROSOL, METERED ORAL
Qty: 0 | Refills: 0 | DISCHARGE

## 2023-04-27 RX ORDER — SODIUM CHLORIDE 9 MG/ML
1000 INJECTION, SOLUTION INTRAVENOUS
Refills: 0 | Status: DISCONTINUED | OUTPATIENT
Start: 2023-05-03 | End: 2023-05-17

## 2023-04-27 RX ORDER — CITALOPRAM 10 MG/1
30 TABLET, FILM COATED ORAL
Qty: 0 | Refills: 0 | DISCHARGE

## 2023-04-27 NOTE — H&P PST ADULT - MOUTH
CHING plan:    -  Home with OP f/u appts (new PCP, Neuro)  -  Family to transport home    2:38 PM - D/C Order noted. New PCP appt arranged for 7/15 with Dr. Danie Nascimento at [facility]. Pt will need to call for Neuro follow-up. Pt's brother will transport her home around 3:30 pm. No further concerns indicated at this time. AVS updated. Patient is ready for discharge from a Care Management standpoint. RN informed. 8:58 AM - CM acknowledges inpatient admission for viral meningitis. CM will follow pt for consults and any needs prior to discharge. If any concerns or questions arise pertaining to pt discharge, please contact unit CM. Care Management Interventions  PCP Verified by CM: No  Mode of Transport at Discharge: Other (see comment)(family)  Transition of Care Consult (CM Consult):  Other, Discharge Planning(anticipate home with OP f/u appts)  Discharge Durable Medical Equipment: No  Physical Therapy Consult: No  Occupational Therapy Consult: No  Speech Therapy Consult: No  Discharge Location  Discharge Placement: Home with outpatient services    ZARI Lugo  Care Manager  [phone] normal mouth and gums/moist

## 2023-04-27 NOTE — H&P PST ADULT - NEGATIVE RESPIRATORY AND THORAX SYMPTOMS
[facility]    [address] [address]    Phone:  [phone]    Fax:  [phone]       Thank You for choosing us for your health care visit. We are glad to serve you and happy to provide you with this summary of your visit. Please help us to ensure we have accurate records. If you find anything that needs to be changed, please let our staff know as soon as possible.          Your Demographic Information     Patient Name Sex Michoacano Figueroa Female 2008       Ethnic Group Patient Race    Not of  or  Origin       Your Visit Details     Date & Time Provider Department    2017 10:45 AM Vira Lancaster MD [facility]      Your To Do List     Follow-Up    Return in about 1 year (around 2018).      Conditions Discussed Today or Order-Related Diagnoses        Comments    Failed vision screen    -  Primary       Your Vitals Were     BP Pulse Temp Resp    102/64 (70 %/ 71 %)* (BP Location: RUE, Patient Position: Sitting, Cuff Size: Pediatric) 96 98.5 °F (36.9 °C) (Temporal Artery) 22    Height Weight BMI Smoking Status    3' 10\" (1.168 m) (<1 %, Z= -2.64)† 54 lb (24.5 kg) (19 %, Z= -0.89)† 17.94 kg/m2 (76 %, Z= 0.71)† Never Smoker    *BP percentiles are based on NHBPEP's 4th Report    †Growth percentiles are based on CDC 2-20 Years data.      Medications Prescribed or Re-Ordered Today     None      Allergies     Not on File      Immunizations History as of 2017     Name Date    DTaP 2012, 10/13/2009, 2008, 2008, 2008    HEPATITIS A CHILD 10/20/2011, 2009    HIB 10/13/2009, 3/11/2009, 2008, 2008    Hepatitis B Child 2008, 2008, 2008, 2008    Influenza 2012, 10/20/2011, 10/13/2009, 2008    Influenza Quadrivalent Live 10/17/2014    MMR 2012, 10/13/2009    Pneumococcal Conjugate 7 Valent 2009, 2008, 2008, 2008    Polio, INACTIVATED 11/20/2012, 10/13/2009, 2008, 2008, 2008    Rotavirus - Pentavalent 2008, 2008, 2008    Varicella 11/20/2012, 10/13/2009              Patient Instructions          Your Child's Health  8-9 Year-Old Visit      Michoacano Cornell  April 21, 2017    Visit Vitals   • /64 (BP Location: [facility], Patient Position: Sitting, Cuff Size: Pediatric)   • Pulse 96   • Temp 98.5 °F (36.9 °C) (Temporal Artery)   • Resp 22   • Ht 3' 10\" (1.168 m)   • Wt 24.5 kg   • BMI 17.94 kg/m2     Weight: 54 lbs      DEVELOPMENT:  At this age a typical child:  1.  Can read for enjoyment.  2.  Can tell a joke coherently.  3.  Is very concerned about rules and fairness.  4.  Is outspoken when he or she perceives unfairness in parents.  5.  Is able to take care of his or her room and assume responsibility with fewer reminders than at six years of age.  6.  Is learning to tell time (this sometimes takes until age ten).    SAFETY/ACCIDENT PREVENTION:  Accidents are the most common causes of death in  this age range.  Automobile fatalities are the most numerous, followed by deaths due to fire, drowning, falls, and gun accidents.  Continued reminders about seat belts, swimming rules, playing with fire, playing with guns, and other safety measures are necessary.  The seat belt should be across the hips and not across the stomach.  Michoacano should ride in the back seat.  The center seat is the safest if it has a shoulder harness.      INDIVIDUAL DIFFERENCES:  In this age range, differing abilities in physical coordination, memory, reading, and personality become apparent.  It is particularly important for parents to learn the individual strengths of each child and to praise and encourage them.  Encouragement can take the form of praise, gifts related to the child's interests, trips, memory games at the dinner table, and individual attention from you (yes, they still crave it at this age).    Academic achievement  is easily recognized by both parent and school.  Be sure to support and praise the development of non-academic strengths such as artistic, mechanical, social, athletic, or verbal skills.  \"Magdalena is my finder; she always remembers where things are,\" is more likely to have a good effect than, \"She doesn't get good grades like her brother, but we like her anyway.\" Your hardest job may be telling the difference between low effort and genuine difficulty when Michoacano encounters poor grades.    SUN EXPOSURE:  There is now evidence that sun exposure, especially sunburn before the age of ten, increases the risk of skin cancer.  Fair-skinned people always have a higher risk.  Michoacano should avoid the midday sun, use sun block, and wear protective clothing and sunglasses.  Begin to educate her about tanning booths.  There is no such thing as \"safe\" tanning rays.  Set a good example; avoid burning and crash-tanning.  Follow the guidelines in our Sun Exposure handout.    DIET AND EXERCISE:  Some children may need breakfast to function well at school.  However, you should keep in mind that much of the research about the benefits of having breakfast was done by a breakfast cereal company.  In any case, the meal does not need to be big.    A multivitamin with 600 International Units vitamin D should be given to all children who drink less than 32 oz of milk per day.    Continue reminders about regular tooth brushing.    Television ads and convenience foods encourage a diet high in salt, fat, and calories as well as low in fiber.  Having a variety of alternative snack foods can promote better nutrition.  Pre-cut vegetables, dried fruits, fresh fruits, cheese, and unsalted nuts are examples of good snack foods.  Having these available will not be effective, however, if Michoacano knows that the cupboards are full of chips and cupcakes.    TELEVISION/VIDEO:  1.  Limit viewing to a maximum of two hours per day.    2.  Excessive TV is associated  with obesity, aggressive behavior, and negative moods.  3.  Do not allow TV shows or videos that promote bad attitudes.  Many videos consistently portray women or minorities as victims.   4.  Teach Michoacano not to eat while watching TV by not doing it yourself.  5.  Encourage other forms of learning and entertainment, such as books, story-telling, and trips to museums, farms, zoos, etc.    CHORES/ALLOWANCES:  Children at this age will handle chores with encouragement, but they can be very sensitive to fairness.  Chores should be evenly divided among siblings or rotated.  Excusing a child because of special activities, even if they are highly valued by the family (for example, music lessons in a musical family), will breed resentment in the siblings who end up picking up the chores.    An allowance can provide a useful tool for learning about Michoacano's values and personality as well as providing a lever for discipline.  Make certain that the amount is not so large that Michoacano does not have to make choices about what to spend it on.    MEDICATION FOR FEVER OR PAIN:   Acetaminophen liquid (e.g., Tylenol or Tempra) may be given every four hours as needed for pain or fever.  Acetaminophen liquid is less concentrated than the infant dropper bottle type. Be sure to check which product CONCENTRATION you are using.      CHILDREN’S Tylenol/Acetaminophen  (160 MG/5 mL)    Child’s Weight:  Dose:  36 - 47 pounds:    240 mg (7.5 mL (1 1/2 Teaspoons))  48 - 59 pounds:    320 mg (10.0 mL (2 Teaspoons))  60 - 71 pounds:    400 mg (12.5 mL (2 1/2 Teaspoons))  72 - 95 pounds:    480 mg (15.0 mL (3 Teaspoons))    CHILDREN’S Tylenol/Acetaminophen MELTAWAYS ( 80 MG tablets)    Child’s Weight:  Dose:  36 - 47 pounds:    240 mg (3 meltaway tablets)  48 - 59 pounds:    320 mg (4 meltaway tablets)  60 - 71 pounds:    400 mg (5 meltaway tablets)  72 - 95 pounds:    480 mg (6 meltaway tablets)    Eriberto (Jr) Tylenol/Acetaminophen MELTAWAYS (160 MG  tablets)    Child’s Weight:  Dose:  36 - 47 pounds:    240 mg (1 1/2 meltaway tablets)  48 - 59 pounds:    320 mg (2 meltaway tablets)  60 - 71 pounds:    400 mg (2 1/2 meltaway tablets)  72 - 95 pounds:    480 mg (3 meltaway tablets)    CHILDREN'S Ibuprofen (e.g., Advil or Motrin) may be given every six hours as needed for pain or fever.    Child’s Weight:  Dose:  48 - 59 pounds:    200 mg (2 Teaspoons of liquid)  60 - 71 pounds:    250 mg (2 1/2Teaspoons of liquid)  72 - 95 pounds:    300 mg (3 Teaspoons of liquid)    NEXT VISIT:  IN 1 - 2 YEARS       Thank you for entrusting your care to [facility].                    no wheezing/no dyspnea/no cough/no hemoptysis/no pleuritic chest pain

## 2023-04-27 NOTE — H&P PST ADULT - GASTROINTESTINAL
details… normal/soft/nontender/nondistended/normal active bowel sounds/no guarding/no rigidity/no organomegaly/no palpable ryan/no masses palpable

## 2023-04-27 NOTE — H&P PST ADULT - PROBLEM SELECTOR PLAN 1
[facility]  labs done results pending, ekg done.  Preop teaching done, pt able to verbalize understanding.   medications day of procedure- venlafaxine, losartan, levothyroxine, pepcid

## 2023-04-27 NOTE — H&P PST ADULT - HISTORY OF PRESENT ILLNESS
46y/o female scheduled for hysteroscopy dilation and curettage symphion on 5/3/2023,  Pt states, "hx of uterine polyps, for the past 3 months has irregular bleeding, US shows polyp."

## 2023-04-27 NOTE — H&P PST ADULT - NSICDXPASTMEDICALHX_GEN_ALL_CORE_FT
PAST MEDICAL HISTORY:  Anxiety     Asthma     Depression     Fibroids     Hypothyroidism     Irregular bleeding     Polyp of corpus uteri

## 2023-05-02 ENCOUNTER — TRANSCRIPTION ENCOUNTER (OUTPATIENT)
Age: 48
End: 2023-05-02

## 2023-05-02 NOTE — ASU PATIENT PROFILE, ADULT - NSICDXPASTSURGICALHX_GEN_ALL_CORE_FT
PAST SURGICAL HISTORY:  History of D&C     History of skin cancer Moh's surgery for squamous cell CA on the nose    Knee injury left knee orthoscopic surgery 1989    S/P appendectomy 2000    S/P cholecystectomy 2004    S/P right knee arthroscopy

## 2023-05-02 NOTE — ASU PATIENT PROFILE, ADULT - SUPPORT PERSON PHONE
Pt keeps trying to place surgical hand behind his head and causing pain, realized pt sleep likes this pillow wrap softly around left hand and forearm to minimize injury to surgical hand.   [phone]

## 2023-05-03 ENCOUNTER — TRANSCRIPTION ENCOUNTER (OUTPATIENT)
Age: 48
End: 2023-05-03

## 2023-05-03 ENCOUNTER — OUTPATIENT (OUTPATIENT)
Dept: OUTPATIENT SERVICES | Facility: HOSPITAL | Age: 48
LOS: 1 days | Discharge: HOME | End: 2023-05-03
Payer: COMMERCIAL

## 2023-05-03 VITALS
HEIGHT: 67.25 IN | TEMPERATURE: 98 F | HEART RATE: 63 BPM | WEIGHT: 223.99 LBS | DIASTOLIC BLOOD PRESSURE: 78 MMHG | RESPIRATION RATE: 15 BRPM | OXYGEN SATURATION: 98 % | SYSTOLIC BLOOD PRESSURE: 119 MMHG

## 2023-05-03 VITALS
HEART RATE: 66 BPM | SYSTOLIC BLOOD PRESSURE: 135 MMHG | RESPIRATION RATE: 15 BRPM | OXYGEN SATURATION: 98 % | DIASTOLIC BLOOD PRESSURE: 62 MMHG

## 2023-05-03 DIAGNOSIS — Z98.890 OTHER SPECIFIED POSTPROCEDURAL STATES: Chronic | ICD-10-CM

## 2023-05-03 DIAGNOSIS — N84.0 POLYP OF CORPUS UTERI: ICD-10-CM

## 2023-05-03 DIAGNOSIS — Z85.828 PERSONAL HISTORY OF OTHER MALIGNANT NEOPLASM OF SKIN: Chronic | ICD-10-CM

## 2023-05-03 LAB — HCG UR QL: NEGATIVE — SIGNIFICANT CHANGE UP

## 2023-05-03 PROCEDURE — 88305 TISSUE EXAM BY PATHOLOGIST: CPT | Mod: 26

## 2023-05-03 RX ORDER — SODIUM CHLORIDE 9 MG/ML
1000 INJECTION, SOLUTION INTRAVENOUS
Refills: 0 | Status: DISCONTINUED | OUTPATIENT
Start: 2023-05-03 | End: 2023-05-17

## 2023-05-03 RX ORDER — FENTANYL CITRATE 50 UG/ML
25 INJECTION INTRAVENOUS
Refills: 0 | Status: DISCONTINUED | OUTPATIENT
Start: 2023-05-03 | End: 2023-05-03

## 2023-05-03 RX ORDER — ONDANSETRON 8 MG/1
4 TABLET, FILM COATED ORAL ONCE
Refills: 0 | Status: DISCONTINUED | OUTPATIENT
Start: 2023-05-03 | End: 2023-05-17

## 2023-05-03 RX ORDER — LOSARTAN POTASSIUM 100 MG/1
1 TABLET, FILM COATED ORAL
Qty: 0 | Refills: 0 | DISCHARGE

## 2023-05-03 RX ORDER — VENLAFAXINE HCL 75 MG
3 CAPSULE, EXT RELEASE 24 HR ORAL
Refills: 0 | DISCHARGE

## 2023-05-03 RX ORDER — LEVOTHYROXINE SODIUM 125 MCG
1 TABLET ORAL
Refills: 0 | DISCHARGE

## 2023-05-03 NOTE — ASU DISCHARGE PLAN (ADULT/PEDIATRIC) - NS MD DC FALL RISK RISK
For information on Fall & Injury Prevention, visit: [email or web address] OR  [email or web address] OR  https://www.cdc.gov/steadi/patient.html

## 2023-05-03 NOTE — ASU DISCHARGE PLAN (ADULT/PEDIATRIC) - CARE PROVIDER_API CALL
Connie Bah  OBSTETRICS AND GYNECOLOGY  [address], [address]  [place] [address]  Phone: [phone]  Fax: [phone]  Follow Up Time: 2 weeks

## 2023-05-03 NOTE — ASU DISCHARGE PLAN (ADULT/PEDIATRIC) - NURSING INSTRUCTIONS
You received IV Tylenol for pain management at 8:50 AM. Please DO NOT take any Tylenol (Acetaminophen) containing products, such as Vicodin, Percocet, Excedrin, and cold medications for the next 6 hours (2:50 PM). DO NOT TAKE MORE THAN 3000 MG OF TYLENOL in a 24 hour period. _______________________________________________________________________ You received IV Toradol for pain management at 9:05. Please DO NOT take Motrin/Ibuprofen/Advil/Aleve/NSAIDs (Non-Steroidal Anti-Inflammatory Drugs) for the next 6 hours (until 3:05 PM).

## 2023-05-03 NOTE — ASU DISCHARGE PLAN (ADULT/PEDIATRIC) - OK TO LEAVE MESSAGE ON VOICEMAIL
pt complaining of chest tightness starting last night. pt is visibly anxious and difficult to console at moments. pt does not appear to be in distress.
Yes

## 2023-05-03 NOTE — ASU DISCHARGE PLAN (ADULT/PEDIATRIC) - MEDICATION INSTRUCTIONS
Ibuprofen 200 mg, 3 tablets by mouth every 6 hours as needed for pain Ibuprofen 200 mg, 3 tablets by mouth every 6 hours as needed for pain.

## 2023-05-09 LAB — SURGICAL PATHOLOGY STUDY: SIGNIFICANT CHANGE UP

## 2023-08-09 NOTE — ASU PREOP CHECKLIST - DNR CLARIFICATION FORM COMPLETED
Complete antibiotic as prescribed  Using inhalers as prescribed     Take torsemide as prescribed   
n/a

## 2024-05-17 PROBLEM — N84.0 POLYP OF CORPUS UTERI: Chronic | Status: ACTIVE | Noted: 2023-04-27

## 2024-05-20 ENCOUNTER — APPOINTMENT (OUTPATIENT)
Dept: ORTHOPEDIC SURGERY | Facility: CLINIC | Age: 49
End: 2024-05-20
Payer: COMMERCIAL

## 2024-05-20 VITALS
BODY MASS INDEX: 35.16 KG/M2 | SYSTOLIC BLOOD PRESSURE: 134 MMHG | HEIGHT: 68 IN | HEART RATE: 69 BPM | WEIGHT: 232 LBS | DIASTOLIC BLOOD PRESSURE: 84 MMHG

## 2024-05-20 DIAGNOSIS — M47.812 SPONDYLOSIS W/OUT MYELOPATHY OR RADICULOPATHY, CERVICAL REGION: ICD-10-CM

## 2024-05-20 DIAGNOSIS — M25.512 PAIN IN LEFT SHOULDER: ICD-10-CM

## 2024-05-20 PROCEDURE — 72040 X-RAY EXAM NECK SPINE 2-3 VW: CPT

## 2024-05-20 PROCEDURE — 99203 OFFICE O/P NEW LOW 30 MIN: CPT

## 2024-05-20 PROCEDURE — 73030 X-RAY EXAM OF SHOULDER: CPT | Mod: LT

## 2024-05-20 NOTE — DISCUSSION/SUMMARY
[de-identified] : The patient has evidence of injury to the left shoulder acromioclavicular joint as well as exacerbation of cervical spine arthritis.  I have discussed the pathology, natural history and treatment options with her.  She is referred for physical therapy.  She will take over-the-counter medication.  She will be reevaluated in 1 month.

## 2024-05-20 NOTE — HISTORY OF PRESENT ILLNESS
[de-identified] : 48-year-old RHD female insurance verification presents for initial evaluation left shoulder pain worsening over the past 4 weeks. She reports taking a fall out on to the left arm in January, went to [facility] and was told she had a contusion. She reports worsening constant pulling and throbbing posterior shoulder pain worse with sleeping on the left side, getting dressed, lifting and typing. She occasionally has numbness in the left hand with typing for prolonged periods. She has tried Tylenol with mild relief. Denies prior injuries.

## 2024-05-20 NOTE — PHYSICAL EXAM
[Rad] : radial 2+ and symmetric bilaterally [Normal] : Alert and in no acute distress [Poor Appearance] : well-appearing [Acute Distress] : not in acute distress [Obese] : not obese [de-identified] : The patient has no respiratory distress. Mood and affect are normal. The patient is alert and oriented to person, place and time. Examination of the cervical spine demonstrates no deformity. There is tenderness of the left paracervical muscles and the left trapezius muscle. There is mild muscle spasm. Cervical spine range of motion is right lateral rotation of 40, left lateral rotation of 40, extension of 45 and flexion of 45. Upper extremity neurologic exam is intact with regard to sensation. Motor function is 5 over 5 in all groups in the upper extremities. Deep tendon reflexes are 2+ and equal at the biceps, triceps and brachial radialis. Examination of the left shoulder demonstrates tenderness at the acromioclavicular joint.  There is no deformity.  There is no instability.  Drop arm test is negative.  Impingement is positive.  Belly press test is negative.  There is no restriction of motion.  Elbow motion is pain-free.  The skin is intact.  There is no lymphedema. [de-identified] : AP and lateral x-rays of the cervical spine demonstrate straightening and degenerative changes.  AP, transscapular and axillary x-rays of the left shoulder demonstrate no fracture, no dislocation and no bony abnormality.

## 2024-06-21 ENCOUNTER — APPOINTMENT (OUTPATIENT)
Dept: ORTHOPEDIC SURGERY | Facility: CLINIC | Age: 49
End: 2024-06-21

## 2024-10-09 ENCOUNTER — NON-APPOINTMENT (OUTPATIENT)
Age: 49
End: 2024-10-09
